# Patient Record
Sex: FEMALE | Race: BLACK OR AFRICAN AMERICAN | NOT HISPANIC OR LATINO | URBAN - METROPOLITAN AREA
[De-identification: names, ages, dates, MRNs, and addresses within clinical notes are randomized per-mention and may not be internally consistent; named-entity substitution may affect disease eponyms.]

---

## 2021-12-20 ENCOUNTER — EMERGENCY (EMERGENCY)
Facility: HOSPITAL | Age: 21
LOS: 1 days | Discharge: HOME | End: 2021-12-20
Attending: EMERGENCY MEDICINE
Payer: MEDICAID

## 2021-12-20 VITALS
DIASTOLIC BLOOD PRESSURE: 97 MMHG | TEMPERATURE: 98 F | RESPIRATION RATE: 18 BRPM | SYSTOLIC BLOOD PRESSURE: 133 MMHG | OXYGEN SATURATION: 100 % | HEART RATE: 77 BPM

## 2021-12-20 VITALS
TEMPERATURE: 98 F | RESPIRATION RATE: 20 BRPM | SYSTOLIC BLOOD PRESSURE: 112 MMHG | OXYGEN SATURATION: 99 % | HEIGHT: 65 IN | WEIGHT: 229.94 LBS | HEART RATE: 76 BPM | DIASTOLIC BLOOD PRESSURE: 75 MMHG

## 2021-12-20 PROCEDURE — 99284 EMERGENCY DEPT VISIT MOD MDM: CPT

## 2021-12-20 NOTE — ED ADULT NURSE NOTE - OBJECTIVE STATEMENT
21y female, PMH asthma, AAOx3, pt complaining of subjective fevers, guinea pig bite to foot and left hip pain, pt denies HA, chest pain, shortness of breath, abd pain, n/v/d, urinary symptoms, bed in lowest position, comfort and safety provided.

## 2021-12-21 LAB
HCG SERPL-ACNC: <2 MIU/ML — SIGNIFICANT CHANGE UP
RAPID RVP RESULT: DETECTED
RV+EV RNA SPEC QL NAA+PROBE: DETECTED
SARS-COV-2 RNA SPEC QL NAA+PROBE: SIGNIFICANT CHANGE UP

## 2021-12-21 PROCEDURE — 84702 CHORIONIC GONADOTROPIN TEST: CPT

## 2021-12-21 PROCEDURE — 96375 TX/PRO/DX INJ NEW DRUG ADDON: CPT

## 2021-12-21 PROCEDURE — 96374 THER/PROPH/DIAG INJ IV PUSH: CPT

## 2021-12-21 PROCEDURE — 99284 EMERGENCY DEPT VISIT MOD MDM: CPT | Mod: 25

## 2021-12-21 PROCEDURE — 0225U NFCT DS DNA&RNA 21 SARSCOV2: CPT

## 2021-12-21 RX ORDER — SODIUM CHLORIDE 9 MG/ML
1000 INJECTION, SOLUTION INTRAVENOUS ONCE
Refills: 0 | Status: COMPLETED | OUTPATIENT
Start: 2021-12-21 | End: 2021-12-21

## 2021-12-21 RX ORDER — KETOROLAC TROMETHAMINE 30 MG/ML
15 SYRINGE (ML) INJECTION ONCE
Refills: 0 | Status: DISCONTINUED | OUTPATIENT
Start: 2021-12-21 | End: 2021-12-21

## 2021-12-21 RX ORDER — METOCLOPRAMIDE HCL 10 MG
10 TABLET ORAL ONCE
Refills: 0 | Status: COMPLETED | OUTPATIENT
Start: 2021-12-21 | End: 2021-12-21

## 2021-12-21 RX ORDER — ACETAMINOPHEN 500 MG
650 TABLET ORAL ONCE
Refills: 0 | Status: COMPLETED | OUTPATIENT
Start: 2021-12-21 | End: 2021-12-21

## 2021-12-21 RX ADMIN — Medication 650 MILLIGRAM(S): at 00:40

## 2021-12-21 RX ADMIN — Medication 15 MILLIGRAM(S): at 00:40

## 2021-12-21 RX ADMIN — Medication 10 MILLIGRAM(S): at 00:40

## 2021-12-21 RX ADMIN — SODIUM CHLORIDE 1000 MILLILITER(S): 9 INJECTION, SOLUTION INTRAVENOUS at 00:41

## 2021-12-21 NOTE — ED PROVIDER NOTE - NSFOLLOWUPINSTRUCTIONS_ED_ALL_ED_FT
You were seen in the ED for fever and a headache and cough.  Your covid test is still pending. Call the hospital tomorrow for results.    Your symptoms may be due to a viral illness, but you should have further workup with a  primary care doctor in 2-3 days.    For pain and fever, please take 650mg Tylenol every 6 hours as needed or 600mg ibuprofen every 8 hours as needed. Always take ibuprofen with food. You make take both Tylenol and ibuprofen as described above if one medication is not enough for your pain.    Please follow up with your PCP in 2-3 days. Return to the ED if you experience any worsening or new symptoms or any symptoms that concern you, including fevers, chills, shortness of breath, chest pain, numbness, weakness.

## 2021-12-21 NOTE — ED PROVIDER NOTE - OBJECTIVE STATEMENT
Pt is a 22 yo prev healthy F who presents with fever, cough, headache. Symptoms started 3-4 days ago. Subjective fevers, not measured. Covid vaxxed x2, no sick contacts, works as . Headache comes and goes, can be mild or severe, L or R sided, no vision changes, no N/V, no diarrhea. Cough causes chest tightness. Endorses L leg pain, has had sports injury before in that area, denies leg swelling. Had guinea pig bite in L foot 1-2 weeks ago but swelling resolved and foot/calf are pain free. no trauma, no h/o migraine

## 2021-12-21 NOTE — ED PROVIDER NOTE - CLINICAL SUMMARY MEDICAL DECISION MAKING FREE TEXT BOX
Eileen - Pt is a 20 yo prev healthy F who presents with fever, cough, headache. likely viral illness, covid vs other viral etiology. will check hcg, rvp. give pain meds and fluids for headache, no clinical indication for CT Eileen Pierson Pt is a 20 yo prev healthy F who presents with fever, cough, headache. likely viral illness, covid vs other viral etiology. will check hcg, rvp. give pain meds and fluids for headache, no clinical indication for CT    Marin Rosenberg MD, FACEP patient with cough headache and fever, well appearing, eating and drinking without difficulty. Will screen for COVID-19 with rvp. patient educated to take Tylenol 1g every six hours and supplement with ibuprofen 600mg, with food, every six hours which can be taken three hours apart from the Tylenol to have a layered effect.    The patient was serially evaluated throughout emergency department course. There was no acute deterioration up to this time in the department. Patient has demonstrated clinical improvement and is stable, feels better at this time according to emergency department team. Agree with goals/plan of emergency department care as described in this physician's electronic medical record, including diagnostics, therapeutics and consultation as clinically warranted. Will discharge home with close outpatient follow up with primary care physician/provider and specialist if necessary. The patient and/or family was educated on concerning signs and features to return to the emergency department, in layman terms, including but not limited to: nausea, vomiting, fever, chills, persistent/worsening symptoms or any concerns at all. No immediate life threatening issues present on history, clinical exam, or any diagnostic evaluation. The patient is a safe disposition home, has capacity and insight into their condition, is ambulatory in the Emergency Department with no further questions and will follow up with their doctor(s) this week. Diagnosis, prognosis, natural history and treatment was discussed with patient and/or family. The patient and/or family were given the opportunity to ask questions and have them answered in full. The patient and/or family are with capacity and insight into the situation, treatment, risks, benefits, alternative therapies, and understand that they can ask any further questions if needed. Patient and/or family/guardian understands anticipatory guidance and was given strict return and follow up precautions. The patient and/or family/guardian has been informed, in layman terms, of all concerning signs and symptoms to return to Emergency Department, the necessity to follow up with the PMD/Clinic/follow up provided within 2-3 days was explained, and the patient and/or family/guardian reports understanding of above with capacity and insight. The patient and/or family/guardian were informed of any results of their tests and are were encouraged to follow up on the findings with their doctor as well as the need to inform their doctor of any results. The patient and/or family/guardian are aware of the need to follow up with repeat testing as applicable and report understanding of the above with capacity and insight. The patient and/or family/guardian was made aware of any pending test results at the time of discharge and of the need to call back for the final results a well as the need to inform their doctor of the results.

## 2021-12-21 NOTE — ED PROVIDER NOTE - PHYSICAL EXAMINATION
Shanae Arellano MD  GENERAL: Patient awake alert NAD.  HEENT: NC/AT, Moist mucous membranes, PERRL, EOMI.  LUNGS: CTAB, no wheezes or crackles.   CARDIAC: RRR, no m/r/g.    ABDOMEN: Soft, NT, ND, No rebound, guarding. No CVA tenderness.   EXT: No edema. No calf tenderness. CV 2+DP/PT bilaterally.   MSK: no pain with movement, no deformities.  NEURO: A&Ox3. Moving all extremities.  SKIN: Warm and dry. No rash.  PSYCH: Normal affect.

## 2021-12-21 NOTE — ED PROVIDER NOTE - NSFOLLOWUPCLINICS_GEN_ALL_ED_FT
[facility] General Internal Medicine  General Internal Medicine  [address] [place] [address]  Phone: [phone]  Fax:

## 2021-12-21 NOTE — ED PROVIDER NOTE - PROGRESS NOTE DETAILS
pt feels better, ha gone. discussed f/u and return precautions, rvp pending, pt aware Marin Rosenberg MD, FACEP patient without tachypnea, non-tachycardic, well appearing, speaking in full sentences, The patient tolerated an oral "po" challenge.

## 2021-12-21 NOTE — ED PROVIDER NOTE - PATIENT PORTAL LINK FT
You can access the FollowMyHealth Patient Portal offered by [facility] by registering at the following website: [email or web address]. By joining [employer]’s FollowMyHealth portal, you will also be able to view your health information using other applications (apps) compatible with our system.

## 2022-04-21 ENCOUNTER — EMERGENCY (EMERGENCY)
Facility: HOSPITAL | Age: 22
LOS: 1 days | Discharge: HOME | End: 2022-04-21
Attending: STUDENT IN AN ORGANIZED HEALTH CARE EDUCATION/TRAINING PROGRAM
Payer: MEDICAID

## 2022-04-21 VITALS
TEMPERATURE: 98 F | OXYGEN SATURATION: 100 % | HEIGHT: 65 IN | SYSTOLIC BLOOD PRESSURE: 116 MMHG | DIASTOLIC BLOOD PRESSURE: 79 MMHG | RESPIRATION RATE: 18 BRPM | WEIGHT: 229.94 LBS | HEART RATE: 91 BPM

## 2022-04-21 PROCEDURE — 99284 EMERGENCY DEPT VISIT MOD MDM: CPT

## 2022-04-22 PROBLEM — Z78.9 OTHER SPECIFIED HEALTH STATUS: Chronic | Status: ACTIVE | Noted: 2021-12-21

## 2022-04-22 LAB
HCOV PNL SPEC NAA+PROBE: DETECTED
RAPID RVP RESULT: DETECTED
SARS-COV-2 RNA SPEC QL NAA+PROBE: SIGNIFICANT CHANGE UP

## 2022-04-22 PROCEDURE — 0225U NFCT DS DNA&RNA 21 SARSCOV2: CPT

## 2022-04-22 PROCEDURE — 99283 EMERGENCY DEPT VISIT LOW MDM: CPT

## 2022-04-22 NOTE — ED PROVIDER NOTE - NEUROLOGICAL, MLM
Alert and oriented, no focal deficits, no motor or sensory deficits. Alert and oriented, no focal deficits, no motor or sensory deficits. strength and sensation intact throughout, smooth gait. normal speech.

## 2022-04-22 NOTE — ED POST DISCHARGE NOTE - ADDITIONAL DOCUMENTATION
4/22/22: Pt returned call. Informed her of result as above. Reiterated supportive measures and return precautions. Pt acknowledged understanding, all questions answered, appreciative of call. - Santino Chaudhari PA-C.

## 2022-04-22 NOTE — ED PROVIDER NOTE - PATIENT PORTAL LINK FT
You can access the FollowMyHealth Patient Portal offered by [facility] by registering at the following website: [email or web address]. By joining "[employer]"’s FollowMyHealth portal, you will also be able to view your health information using other applications (apps) compatible with our system.

## 2022-04-22 NOTE — ED PROVIDER NOTE - CLINICAL SUMMARY MEDICAL DECISION MAKING FREE TEXT BOX
22yo F presenting with intermittent throbbing headache and subjective fevers for the past week with sinus congestion. well appearing, no headache at this time, vital signs wnl. headaches possibly due to migraines and non-compliance with medication, seasonal allergies, viral infection, need for prescription eye glasses. no meningeal signs or concern for meningitis at this time. had extensive conversation concerning supportive care, usage of migraine medication and otc allergy medication as well as wearing eye glasses. will obtain rvp and dc home with pmd and neurology follow up.

## 2022-04-22 NOTE — ED PROVIDER NOTE - NSFOLLOWUPINSTRUCTIONS_ED_ALL_ED_FT
No signs of emergency medical condition on today's workup.  Presumptive diagnosis made, but further evaluation may be required by your primary care doctor or specialist for a definitive diagnosis.  Therefore, follow up as directed and if symptoms change/worsen or any emergency conditions, please return to the ER.   you were seen in the ED today for headaches.   you had a rapid viral panel performed and you will receive an email with the results.   please take prescribed medication for headaches given by your neurologist.   recommend wearing your prescription glasses more often to prevent straining of your eyes.   would recommend starting a allergy medication and nose spray such as flonase for seasonal allergies.   follow up with your neurologist and primary care doctor.   return to the emergency department for any new or worsening symptoms.

## 2022-04-22 NOTE — ED PROVIDER NOTE - NSICDXPASTMEDICALHX_GEN_ALL_CORE_FT
Normal rate, regular rhythm.  Heart sounds S1, S2.  No murmurs, rubs or gallops.
PAST MEDICAL HISTORY:  No pertinent past medical history

## 2022-04-22 NOTE — ED ADULT NURSE REASSESSMENT NOTE - NS ED NURSE REASSESS COMMENT FT1
Patient d/c. Reviewed d/c paperwork with patients, all questions answered at this time. Patient verbalizes understanding. Patient instructed to return to the ER for any worsening s/s including chest pain, SOB, fever, n/v/d. Patient alert and stable at time of d/c. Patient will follow up with her neurologist.

## 2022-04-22 NOTE — ED PROVIDER NOTE - ATTENDING CONTRIBUTION TO CARE
I, Bailey Duarte, performed a history and physical exam of the patient and discussed their management with the resident and /or advanced care provider. I reviewed the resident and /or ACP's note and agree with the documented findings and plan of care except where otherwise noted in my note. I was present and available for all procedures.    22 yo migraines, asthma p/w fever (subjective), lightheadness with standing, headache, nonproductive cough x 1 week, symptoms improving.   Headache is intermittent, throbbing, feels similar to prior migraines  Tolerating PO   No numbness/tingling, weakness, vision changes.   Takes Nurtec for migraines but not consistently     PHYSICAL EXAM:   General: well-appearing, appears stated age, not in extremis   HEENT: NC/AT, no facial sinus tenderness, airway patent  Cardiovascular: regular rate and rhythm, + S1/S2, no murmurs, rubs, gallops appreciated  Respiratory: clear to auscultation bilaterally, good aeration bilaterally, nonlabored respirations  Abdominal: soft, nontender, nondistended, no rebound, guarding or rigidity  Back: no costovertebral tenderness,   Extremities: no LE edema or calf tenderness b/l.   Neuro: Alert and oriented x3. Moving all extremities. No focal deficits  Psychiatric: appropriate mood and affect.   -Bailey Duarte MD Attending Physician     Likely viral syndrome, low suspicion bacterial PNA. xray discussed with patient - would like to defer for now. Will get rvp, return pracuations, outpatient follow up I, Bailey Duarte, performed a history and physical exam of the patient and discussed their management with the resident and /or advanced care provider. I reviewed the resident and /or ACP's note and agree with the documented findings and plan of care except where otherwise noted in my note. I was present and available for all procedures.    22 yo migraines, asthma p/w fever (subjective), lightheadness with standing, headache, nonproductive cough x 1 week, symptoms improving.   Headache is intermittent, throbbing, feels similar to prior migraines  Tolerating PO   No numbness/tingling, weakness, vision changes.   Takes Nurtec for migraines but not consistently     PHYSICAL EXAM:   General: well-appearing, appears stated age, not in extremis   HEENT: NC/AT, no facial sinus tenderness, airway patent  Cardiovascular: regular rate and rhythm, + S1/S2, no murmurs, rubs, gallops appreciated  Respiratory: clear to auscultation bilaterally, good aeration bilaterally, nonlabored respirations  Abdominal: soft, nontender, nondistended, no rebound, guarding or rigidity  Back: no costovertebral tenderness,   Extremities: no LE edema or calf tenderness b/l.   Neuro: Alert and oriented x3. Moving all extremities. No focal deficits  Psychiatric: appropriate mood and affect.   -Bailey Duarte MD Attending Physician     Likely viral syndrome, low suspicion bacterial PNA. low concern meningitis xray discussed with patient - would like to defer for now. Will get rvp, return pracuations, outpatient follow up I, Bailey Duarte, performed a history and physical exam of the patient and discussed their management with the resident and /or advanced care provider. I reviewed the resident and /or ACP's note and agree with the documented findings and plan of care except where otherwise noted in my note. I was present and available for all procedures.    20 yo migraines, asthma p/w fever (subjective), lightheadedness with standing, headache, nonproductive cough x 1 week, symptoms improving.   Headache is intermittent, throbbing, feels similar to prior migraines  Tolerating PO   No numbness/tingling, weakness, vision changes.   Takes Nurtec for migraines but not consistently , had recent reportedly normal mri with neurology    PHYSICAL EXAM:   General: well-appearing, appears stated age, not in extremis   HEENT: NC/AT, no facial sinus tenderness, airway patent  Cardiovascular: regular rate and rhythm, + S1/S2, no murmurs, rubs, gallops appreciated  Respiratory: clear to auscultation bilaterally, good aeration bilaterally, nonlabored respirations  Abdominal: soft, nontender, nondistended, no rebound, guarding or rigidity  Back: no costovertebral tenderness,   Extremities: no LE edema or calf tenderness b/l.   Neuro: Alert and oriented x3. Moving all extremities. No focal deficits  Psychiatric: appropriate mood and affect.   -Bailey Duarte MD Attending Physician     Likely viral syndrome, low suspicion bacterial PNA, cough nonproductive, no focal lung findings. , xray discussed with patient - would like to defer for now. low concern for meningitis. Will get rvp, return pracuations, outpatient follow up

## 2022-04-22 NOTE — ED ADULT NURSE NOTE - OBJECTIVE STATEMENT
20 y/o female coming to the ER with c/o fevers and headache. PMH migraines. Patient states she has had subjective fevers as well as headaches the past week. Patient states that she has not been taking her migraine medications and has not followed up with her neurologist. Patient endorses photosensitivity as well as a 7/10 headache. PERRL. Patient moving all extremities. Patient denies chest pain, blurry vision, numbness/tingling, chills, n/v/d, urinary symptoms, abdominal pain. Safety measures maintained. Bed in the lowest position. Call bell within reach. No acute distress noted or further complaints at this time.

## 2022-04-22 NOTE — ED PROVIDER NOTE - OBJECTIVE STATEMENT
22yo F with Hx of migraines presenting with complaints of headache. recently diagnosed with migraines and given prescription by her neurologist which she hasn't been taking. has been having intermittent throbbing headaches for the past week with subjective fevers. with nasal congestion and sinus pressure. occasional lightheadedness with HA upon standing. no LOC, visual changes, nausea, vomiting, cough, chest pain, sob. has prescription glasses but doesn't wear them daily. has been taking ibuprofen. currently no headache or complaints.

## 2022-10-31 ENCOUNTER — EMERGENCY (EMERGENCY)
Facility: HOSPITAL | Age: 22
LOS: 1 days | Discharge: HOME | End: 2022-10-31
Attending: EMERGENCY MEDICINE
Payer: MEDICAID

## 2022-10-31 VITALS
OXYGEN SATURATION: 99 % | HEART RATE: 72 BPM | RESPIRATION RATE: 18 BRPM | SYSTOLIC BLOOD PRESSURE: 124 MMHG | DIASTOLIC BLOOD PRESSURE: 84 MMHG | TEMPERATURE: 98 F

## 2022-10-31 VITALS
WEIGHT: 225.09 LBS | RESPIRATION RATE: 14 BRPM | OXYGEN SATURATION: 99 % | HEART RATE: 75 BPM | HEIGHT: 65 IN | SYSTOLIC BLOOD PRESSURE: 125 MMHG | TEMPERATURE: 99 F | DIASTOLIC BLOOD PRESSURE: 86 MMHG

## 2022-10-31 LAB — HCG UR QL: NEGATIVE — SIGNIFICANT CHANGE UP

## 2022-10-31 PROCEDURE — 71046 X-RAY EXAM CHEST 2 VIEWS: CPT

## 2022-10-31 PROCEDURE — 81025 URINE PREGNANCY TEST: CPT

## 2022-10-31 PROCEDURE — 93005 ELECTROCARDIOGRAM TRACING: CPT

## 2022-10-31 PROCEDURE — 99284 EMERGENCY DEPT VISIT MOD MDM: CPT

## 2022-10-31 PROCEDURE — 82962 GLUCOSE BLOOD TEST: CPT

## 2022-10-31 PROCEDURE — 71046 X-RAY EXAM CHEST 2 VIEWS: CPT | Mod: 26

## 2022-10-31 PROCEDURE — 99285 EMERGENCY DEPT VISIT HI MDM: CPT | Mod: 25

## 2022-10-31 PROCEDURE — 93010 ELECTROCARDIOGRAM REPORT: CPT

## 2022-10-31 RX ORDER — FLUCONAZOLE 150 MG/1
100 TABLET ORAL ONCE
Refills: 0 | Status: DISCONTINUED | OUTPATIENT
Start: 2022-10-31 | End: 2022-10-31

## 2022-10-31 RX ORDER — FLUCONAZOLE 150 MG/1
150 TABLET ORAL ONCE
Refills: 0 | Status: COMPLETED | OUTPATIENT
Start: 2022-10-31 | End: 2022-10-31

## 2022-10-31 RX ADMIN — FLUCONAZOLE 150 MILLIGRAM(S): 150 TABLET ORAL at 19:11

## 2022-10-31 NOTE — ED PROVIDER NOTE - NSFOLLOWUPINSTRUCTIONS_ED_ALL_ED_FT
See your Primary Doctor and OBGYN this week for follow up -- call to discuss.    Stay well hydrated, eat regular healthy meals, get plenty of rest, minimize stress, practice tactile breathing.    See PANIC ATTACK information and return instructions given to you.    Seek immediate medical care for new/worsening symptoms/concerns. See your Primary Doctor and OBGYN this week for follow up -- call to discuss.    Stay well hydrated, eat regular healthy meals, get plenty of rest, minimize stress, practice tactile breathing.    See PANIC ATTACK and VULVOVAGINITIS information and return instructions given to you.    Seek immediate medical care for new/worsening symptoms/concerns.

## 2022-10-31 NOTE — ED PROVIDER NOTE - PATIENT PORTAL LINK FT
You can access the FollowMyHealth Patient Portal offered by [facility] by registering at the following website: [email or web address]. By joining [employer]’s FollowMyHealth portal, you will also be able to view your health information using other applications (apps) compatible with our system.

## 2022-10-31 NOTE — ED ADULT NURSE NOTE - OBJECTIVE STATEMENT
Female 22 years old alert and orientedx4 came in for chest pain. Pt reports intermittent chest pain non radiating for 2 weeks associated with mild dizziness and sob when going up the stairs. Reports also "yeast infection" on and off for a year". I want it to be check". Reports mild vaginal itching. Denies fever, cough, N/V or urinary symptoms. Will monitor.

## 2022-10-31 NOTE — ED PROVIDER NOTE - CARE PLAN
Principal Discharge DX:	Anxiety attack   1 Principal Discharge DX:	Anxiety attack  Secondary Diagnosis:	Vulvovaginitis

## 2022-10-31 NOTE — ED PROVIDER NOTE - NS ED ROS FT
GENERAL: no fever, no chills, no weight loss  EYES: no change in vision, no irritation, no discharge, no redness, no pain  HEENT: no trouble swallowing or speaking, no throat pain, no ear pain  CARDIAC: +chest pain, no palpitations   PULMONARY: no cough, no shortness of breath, no wheezing  GI: no abdominal pain, no nausea, no vomiting, no diarrhea, no constipation, no melena, no hematochezia, no hematemesis  : +vaginal itching, vaginal bumps, no changes in urination, no dysuria, no frequency, no hematuria, no discharge  SKIN: no rashes  NEURO: no headache, no numbness, no weakness  MSK: no joint pain, no muscle pain, no back pain, no calf pain

## 2022-10-31 NOTE — ED ADULT NURSE NOTE - NS_SISCREENINGSR_GEN_ALL_ED
Quality 226: Preventive Care And Screening: Tobacco Use: Screening And Cessation Intervention: Patient screened for tobacco use and is an ex/non-smoker Quality 130: Documentation Of Current Medications In The Medical Record: Current Medications Documented Detail Level: Detailed Quality 265: Biopsy Follow-Up: Biopsy results reviewed, communicated, tracked, and documented Quality 431: Preventive Care And Screening: Unhealthy Alcohol Use - Screening: Patient screened for unhealthy alcohol use using a single question and scores less than 2 times per year Negative

## 2022-10-31 NOTE — ED PROVIDER NOTE - OBJECTIVE STATEMENT
23 yo F no pmh presents with chest pain for several days and "bumps" on vagina after candida infections for several days. Patient has non exertional, non pleuritic chest pain, worse after eating. Comes on when she's anxious. No nausea, vomiting, sob, leg swelling. Patient has been seen by her obgyn for multiple candida episodes with itchiness. Recently noticed bumps on the inner labia which were seen by the ob gyn but negative for STI/STDs. No fevers, chills, vaginal bleeding, discharge. Patient is sexually active with men, uses condoms.

## 2022-10-31 NOTE — ED PROVIDER NOTE - PHYSICAL EXAMINATION
GENERAL: no acute distress, non-toxic appearing  HEAD: normocephalic, atraumatic  HEENT: normal conjunctiva, oral mucosa moist, neck supple  CARDIAC: regular rate and rhythm, normal S1 and S2,  no appreciable murmurs  PULM: clear to ascultation bilaterally, no crackles, rales, rhonchi, or wheezing  GI: abdomen nondistended, soft, nontender, no guarding or rebound tenderness  : +.5 cm diameter skin colored papular on inner labia, no discolored discharge, cervical os closed with no cervical motion tenderness   NEURO: alert and oriented x 3, normal speech, moving all extremities   MSK: no visible deformities, no peripheral edema, calf tenderness/redness/swelling  SKIN: no visible rashes, dry, well-perfused  PSYCH: appropriate mood and affect

## 2022-10-31 NOTE — ED PROVIDER NOTE - ATTENDING CONTRIBUTION TO CARE
------------ATTENDING NOTE------------  pt c/o episodes of feeling anxious, mild dull ache in L chest, feels heart rate gradually increase and breathing faster, lasts up to 10 minutes, currently no symptoms, no SI/HI, nml cardiac exam w/ equal distal pulses, clear chest w/o distress, soft benign abd, no edema/calf tenderness,   - Morgan Torres MD   ---------------------------------------------- ------------ATTENDING NOTE------------  pt c/o episodes of feeling anxious, mild dull ache in L chest, feels heart rate gradually increase and breathing faster, lasts up to 10 minutes, currently no symptoms, no SI/HI, nml cardiac exam w/ equal distal pulses, clear chest w/o distress, soft benign abd, no edema/calf tenderness, pt has itchy white vaginal discharge similar to past yeast infections, observed w/o new/worsening symptoms, nml VS, in depth dw pt about ddx, tx, yates, continued close outpt fu.  - Morgan Torres MD   ----------------------------------------------

## 2023-10-28 NOTE — ED PROVIDER NOTE - PRINCIPAL DIAGNOSIS
Labor Progress Note        SUBJECTIVE      Reports comfortable with epidural     OBJECTIVE      Vitals:    10/27/23 2130   BP: 91/60   Pulse: 79   Resp: 16   Temp: 97.7 °F (36.5 °C)        SVE:    Dilation:  6 (10/27/23 2157)  Effacement:  80 (10/27/23 2157)  Station:  -3 (10/27/23 2157)    Fetal Surveillance:  Fetal Heart Rate  120/Moderate variability/+ accels/early decels        ASSESSMENT      40w6d IUP  Active phase labor.  FHR Interpretation: Category 1         PLAN      SROM - continue augmentation with pitocin, IUPC placed to measure mvus, fetus in LOT position  FWB - Cat I  GBS - neg  Pain - epidural in place     Guerita Cross MD     Migraine headache Viral syndrome

## 2023-12-15 ENCOUNTER — EMERGENCY (EMERGENCY)
Facility: HOSPITAL | Age: 23
LOS: 1 days | Discharge: HOME | End: 2023-12-15
Attending: EMERGENCY MEDICINE
Payer: MEDICAID

## 2023-12-15 VITALS
DIASTOLIC BLOOD PRESSURE: 84 MMHG | SYSTOLIC BLOOD PRESSURE: 123 MMHG | RESPIRATION RATE: 18 BRPM | OXYGEN SATURATION: 99 % | HEIGHT: 64 IN | HEART RATE: 85 BPM | TEMPERATURE: 99 F | WEIGHT: 220.02 LBS

## 2023-12-15 VITALS
TEMPERATURE: 98 F | DIASTOLIC BLOOD PRESSURE: 67 MMHG | SYSTOLIC BLOOD PRESSURE: 109 MMHG | OXYGEN SATURATION: 98 % | RESPIRATION RATE: 18 BRPM | HEART RATE: 80 BPM

## 2023-12-15 LAB
ALBUMIN SERPL ELPH-MCNC: 4.5 G/DL — SIGNIFICANT CHANGE UP (ref 3.3–5)
ALBUMIN SERPL ELPH-MCNC: 4.5 G/DL — SIGNIFICANT CHANGE UP (ref 3.3–5)
ALP SERPL-CCNC: 60 U/L — SIGNIFICANT CHANGE UP (ref 40–120)
ALP SERPL-CCNC: 60 U/L — SIGNIFICANT CHANGE UP (ref 40–120)
ALT FLD-CCNC: 12 U/L — SIGNIFICANT CHANGE UP (ref 10–45)
ALT FLD-CCNC: 12 U/L — SIGNIFICANT CHANGE UP (ref 10–45)
ANION GAP SERPL CALC-SCNC: 9 MMOL/L — SIGNIFICANT CHANGE UP (ref 5–17)
ANION GAP SERPL CALC-SCNC: 9 MMOL/L — SIGNIFICANT CHANGE UP (ref 5–17)
AST SERPL-CCNC: 17 U/L — SIGNIFICANT CHANGE UP (ref 10–40)
AST SERPL-CCNC: 17 U/L — SIGNIFICANT CHANGE UP (ref 10–40)
BASOPHILS # BLD AUTO: 0.07 K/UL — SIGNIFICANT CHANGE UP (ref 0–0.2)
BASOPHILS # BLD AUTO: 0.07 K/UL — SIGNIFICANT CHANGE UP (ref 0–0.2)
BASOPHILS NFR BLD AUTO: 1.6 % — SIGNIFICANT CHANGE UP (ref 0–2)
BASOPHILS NFR BLD AUTO: 1.6 % — SIGNIFICANT CHANGE UP (ref 0–2)
BILIRUB SERPL-MCNC: 0.4 MG/DL — SIGNIFICANT CHANGE UP (ref 0.2–1.2)
BILIRUB SERPL-MCNC: 0.4 MG/DL — SIGNIFICANT CHANGE UP (ref 0.2–1.2)
BUN SERPL-MCNC: 14 MG/DL — SIGNIFICANT CHANGE UP (ref 7–23)
BUN SERPL-MCNC: 14 MG/DL — SIGNIFICANT CHANGE UP (ref 7–23)
CALCIUM SERPL-MCNC: 9.2 MG/DL — SIGNIFICANT CHANGE UP (ref 8.4–10.5)
CALCIUM SERPL-MCNC: 9.2 MG/DL — SIGNIFICANT CHANGE UP (ref 8.4–10.5)
CHLORIDE SERPL-SCNC: 104 MMOL/L — SIGNIFICANT CHANGE UP (ref 96–108)
CHLORIDE SERPL-SCNC: 104 MMOL/L — SIGNIFICANT CHANGE UP (ref 96–108)
CO2 SERPL-SCNC: 24 MMOL/L — SIGNIFICANT CHANGE UP (ref 22–31)
CO2 SERPL-SCNC: 24 MMOL/L — SIGNIFICANT CHANGE UP (ref 22–31)
CREAT SERPL-MCNC: 0.74 MG/DL — SIGNIFICANT CHANGE UP (ref 0.5–1.3)
CREAT SERPL-MCNC: 0.74 MG/DL — SIGNIFICANT CHANGE UP (ref 0.5–1.3)
EGFR: 117 ML/MIN/1.73M2 — SIGNIFICANT CHANGE UP
EGFR: 117 ML/MIN/1.73M2 — SIGNIFICANT CHANGE UP
EOSINOPHIL # BLD AUTO: 0.21 K/UL — SIGNIFICANT CHANGE UP (ref 0–0.5)
EOSINOPHIL # BLD AUTO: 0.21 K/UL — SIGNIFICANT CHANGE UP (ref 0–0.5)
EOSINOPHIL NFR BLD AUTO: 4.9 % — SIGNIFICANT CHANGE UP (ref 0–6)
EOSINOPHIL NFR BLD AUTO: 4.9 % — SIGNIFICANT CHANGE UP (ref 0–6)
FLUAV AG NPH QL: SIGNIFICANT CHANGE UP
FLUAV AG NPH QL: SIGNIFICANT CHANGE UP
FLUBV AG NPH QL: SIGNIFICANT CHANGE UP
FLUBV AG NPH QL: SIGNIFICANT CHANGE UP
GAS PNL BLDV: SIGNIFICANT CHANGE UP
GAS PNL BLDV: SIGNIFICANT CHANGE UP
GLUCOSE SERPL-MCNC: 90 MG/DL — SIGNIFICANT CHANGE UP (ref 70–99)
GLUCOSE SERPL-MCNC: 90 MG/DL — SIGNIFICANT CHANGE UP (ref 70–99)
HAV IGM SER-ACNC: SIGNIFICANT CHANGE UP
HAV IGM SER-ACNC: SIGNIFICANT CHANGE UP
HBV CORE IGM SER-ACNC: SIGNIFICANT CHANGE UP
HBV CORE IGM SER-ACNC: SIGNIFICANT CHANGE UP
HBV SURFACE AG SER-ACNC: SIGNIFICANT CHANGE UP
HBV SURFACE AG SER-ACNC: SIGNIFICANT CHANGE UP
HCG SERPL-ACNC: <2 MIU/ML — SIGNIFICANT CHANGE UP
HCG SERPL-ACNC: <2 MIU/ML — SIGNIFICANT CHANGE UP
HCT VFR BLD CALC: 39.3 % — SIGNIFICANT CHANGE UP (ref 34.5–45)
HCT VFR BLD CALC: 39.3 % — SIGNIFICANT CHANGE UP (ref 34.5–45)
HCV AB S/CO SERPL IA: 0.1 S/CO — SIGNIFICANT CHANGE UP (ref 0–0.99)
HCV AB S/CO SERPL IA: 0.1 S/CO — SIGNIFICANT CHANGE UP (ref 0–0.99)
HCV AB SERPL-IMP: SIGNIFICANT CHANGE UP
HCV AB SERPL-IMP: SIGNIFICANT CHANGE UP
HGB BLD-MCNC: 12.1 G/DL — SIGNIFICANT CHANGE UP (ref 11.5–15.5)
HGB BLD-MCNC: 12.1 G/DL — SIGNIFICANT CHANGE UP (ref 11.5–15.5)
HIV 1 & 2 AB SERPL IA.RAPID: SIGNIFICANT CHANGE UP
HIV 1 & 2 AB SERPL IA.RAPID: SIGNIFICANT CHANGE UP
HIV 1+2 AB+HIV1 P24 AG SERPL QL IA: SIGNIFICANT CHANGE UP
HIV 1+2 AB+HIV1 P24 AG SERPL QL IA: SIGNIFICANT CHANGE UP
LYMPHOCYTES # BLD AUTO: 1.7 K/UL — SIGNIFICANT CHANGE UP (ref 1–3.3)
LYMPHOCYTES # BLD AUTO: 1.7 K/UL — SIGNIFICANT CHANGE UP (ref 1–3.3)
LYMPHOCYTES # BLD AUTO: 39.8 % — SIGNIFICANT CHANGE UP (ref 13–44)
LYMPHOCYTES # BLD AUTO: 39.8 % — SIGNIFICANT CHANGE UP (ref 13–44)
MAGNESIUM SERPL-MCNC: 1.9 MG/DL — SIGNIFICANT CHANGE UP (ref 1.6–2.6)
MAGNESIUM SERPL-MCNC: 1.9 MG/DL — SIGNIFICANT CHANGE UP (ref 1.6–2.6)
MCHC RBC-ENTMCNC: 24.9 PG — LOW (ref 27–34)
MCHC RBC-ENTMCNC: 24.9 PG — LOW (ref 27–34)
MCHC RBC-ENTMCNC: 30.8 GM/DL — LOW (ref 32–36)
MCHC RBC-ENTMCNC: 30.8 GM/DL — LOW (ref 32–36)
MCV RBC AUTO: 81 FL — SIGNIFICANT CHANGE UP (ref 80–100)
MCV RBC AUTO: 81 FL — SIGNIFICANT CHANGE UP (ref 80–100)
MONOCYTES # BLD AUTO: 0.24 K/UL — SIGNIFICANT CHANGE UP (ref 0–0.9)
MONOCYTES # BLD AUTO: 0.24 K/UL — SIGNIFICANT CHANGE UP (ref 0–0.9)
MONOCYTES NFR BLD AUTO: 5.7 % — SIGNIFICANT CHANGE UP (ref 2–14)
MONOCYTES NFR BLD AUTO: 5.7 % — SIGNIFICANT CHANGE UP (ref 2–14)
NEUTROPHILS # BLD AUTO: 1.99 K/UL — SIGNIFICANT CHANGE UP (ref 1.8–7.4)
NEUTROPHILS # BLD AUTO: 1.99 K/UL — SIGNIFICANT CHANGE UP (ref 1.8–7.4)
NEUTROPHILS NFR BLD AUTO: 46.4 % — SIGNIFICANT CHANGE UP (ref 43–77)
NEUTROPHILS NFR BLD AUTO: 46.4 % — SIGNIFICANT CHANGE UP (ref 43–77)
PHOSPHATE SERPL-MCNC: 3.5 MG/DL — SIGNIFICANT CHANGE UP (ref 2.5–4.5)
PHOSPHATE SERPL-MCNC: 3.5 MG/DL — SIGNIFICANT CHANGE UP (ref 2.5–4.5)
PLATELET # BLD AUTO: 319 K/UL — SIGNIFICANT CHANGE UP (ref 150–400)
PLATELET # BLD AUTO: 319 K/UL — SIGNIFICANT CHANGE UP (ref 150–400)
POTASSIUM SERPL-MCNC: 4 MMOL/L — SIGNIFICANT CHANGE UP (ref 3.5–5.3)
POTASSIUM SERPL-MCNC: 4 MMOL/L — SIGNIFICANT CHANGE UP (ref 3.5–5.3)
POTASSIUM SERPL-SCNC: 4 MMOL/L — SIGNIFICANT CHANGE UP (ref 3.5–5.3)
POTASSIUM SERPL-SCNC: 4 MMOL/L — SIGNIFICANT CHANGE UP (ref 3.5–5.3)
PROT SERPL-MCNC: 8.3 G/DL — SIGNIFICANT CHANGE UP (ref 6–8.3)
PROT SERPL-MCNC: 8.3 G/DL — SIGNIFICANT CHANGE UP (ref 6–8.3)
RBC # BLD: 4.85 M/UL — SIGNIFICANT CHANGE UP (ref 3.8–5.2)
RBC # BLD: 4.85 M/UL — SIGNIFICANT CHANGE UP (ref 3.8–5.2)
RBC # FLD: 16.9 % — HIGH (ref 10.3–14.5)
RBC # FLD: 16.9 % — HIGH (ref 10.3–14.5)
RSV RNA NPH QL NAA+NON-PROBE: SIGNIFICANT CHANGE UP
RSV RNA NPH QL NAA+NON-PROBE: SIGNIFICANT CHANGE UP
SARS-COV-2 RNA SPEC QL NAA+PROBE: SIGNIFICANT CHANGE UP
SARS-COV-2 RNA SPEC QL NAA+PROBE: SIGNIFICANT CHANGE UP
SODIUM SERPL-SCNC: 137 MMOL/L — SIGNIFICANT CHANGE UP (ref 135–145)
SODIUM SERPL-SCNC: 137 MMOL/L — SIGNIFICANT CHANGE UP (ref 135–145)
T PALLIDUM AB TITR SER: NEGATIVE — SIGNIFICANT CHANGE UP
T PALLIDUM AB TITR SER: NEGATIVE — SIGNIFICANT CHANGE UP
WBC # BLD: 4.28 K/UL — SIGNIFICANT CHANGE UP (ref 3.8–10.5)
WBC # BLD: 4.28 K/UL — SIGNIFICANT CHANGE UP (ref 3.8–10.5)
WBC # FLD AUTO: 4.28 K/UL — SIGNIFICANT CHANGE UP (ref 3.8–10.5)
WBC # FLD AUTO: 4.28 K/UL — SIGNIFICANT CHANGE UP (ref 3.8–10.5)

## 2023-12-15 PROCEDURE — 83605 ASSAY OF LACTIC ACID: CPT

## 2023-12-15 PROCEDURE — 87389 HIV-1 AG W/HIV-1&-2 AB AG IA: CPT

## 2023-12-15 PROCEDURE — 85018 HEMOGLOBIN: CPT

## 2023-12-15 PROCEDURE — 84295 ASSAY OF SERUM SODIUM: CPT

## 2023-12-15 PROCEDURE — 86780 TREPONEMA PALLIDUM: CPT

## 2023-12-15 PROCEDURE — 82803 BLOOD GASES ANY COMBINATION: CPT

## 2023-12-15 PROCEDURE — 82435 ASSAY OF BLOOD CHLORIDE: CPT

## 2023-12-15 PROCEDURE — 99283 EMERGENCY DEPT VISIT LOW MDM: CPT

## 2023-12-15 PROCEDURE — 80053 COMPREHEN METABOLIC PANEL: CPT

## 2023-12-15 PROCEDURE — 84100 ASSAY OF PHOSPHORUS: CPT

## 2023-12-15 PROCEDURE — 85014 HEMATOCRIT: CPT

## 2023-12-15 PROCEDURE — 87637 SARSCOV2&INF A&B&RSV AMP PRB: CPT

## 2023-12-15 PROCEDURE — 85025 COMPLETE CBC W/AUTO DIFF WBC: CPT

## 2023-12-15 PROCEDURE — 84132 ASSAY OF SERUM POTASSIUM: CPT

## 2023-12-15 PROCEDURE — 82947 ASSAY GLUCOSE BLOOD QUANT: CPT

## 2023-12-15 PROCEDURE — 82330 ASSAY OF CALCIUM: CPT

## 2023-12-15 PROCEDURE — 86703 HIV-1/HIV-2 1 RESULT ANTBDY: CPT

## 2023-12-15 PROCEDURE — 84702 CHORIONIC GONADOTROPIN TEST: CPT

## 2023-12-15 PROCEDURE — 99284 EMERGENCY DEPT VISIT MOD MDM: CPT

## 2023-12-15 PROCEDURE — 83735 ASSAY OF MAGNESIUM: CPT

## 2023-12-15 PROCEDURE — 80074 ACUTE HEPATITIS PANEL: CPT

## 2023-12-15 RX ORDER — SODIUM CHLORIDE 9 MG/ML
1000 INJECTION, SOLUTION INTRAVENOUS ONCE
Refills: 0 | Status: COMPLETED | OUTPATIENT
Start: 2023-12-15 | End: 2023-12-15

## 2023-12-15 RX ADMIN — SODIUM CHLORIDE 1000 MILLILITER(S): 9 INJECTION, SOLUTION INTRAVENOUS at 16:43

## 2023-12-15 NOTE — ED PROVIDER NOTE - CARE PROVIDERS DIRECT ADDRESSES
[email or web address]

## 2023-12-15 NOTE — ED PROVIDER NOTE - PHYSICAL EXAMINATION
VITALS:   T(C): 37 (12-15-23 @ 12:11), Max: 37 (12-15-23 @ 12:11)  HR: 85 (12-15-23 @ 12:11) (85 - 85)  BP: 123/84 (12-15-23 @ 12:11) (123/84 - 123/84)  RR: 18 (12-15-23 @ 12:11) (18 - 18)  SpO2: 99% (12-15-23 @ 12:11) (99% - 99%)    GENERAL: NAD, lying in bed comfortably  HEAD:  Atraumatic, Normocephalic  EYES: EOMI, PERRLA, conjunctiva and sclera clear  ENT: Moist mucous membranes  NECK: Supple, No JVD  CHEST/LUNG: Clear to auscultation bilaterally; No rales, rhonchi, wheezing, or rubs. Unlabored respirations  HEART: Regular rate and rhythm; No murmurs, rubs, or gallops  ABDOMEN: BSx4; Soft, nondistended, +LLQ abdominal pain  EXTREMITIES:  2+ Peripheral Pulses, brisk capillary refill. No clubbing, cyanosis, or edema  NERVOUS SYSTEM:  A&Ox3, no focal deficits   SKIN: No rashes or lesions  psych: normal behavior, normal affect

## 2023-12-15 NOTE — ED PROVIDER NOTE - CLINICAL SUMMARY MEDICAL DECISION MAKING FREE TEXT BOX
24 yo F hx of vaginal candidiasis and HSV presents for n, v, diarrhea, and LLQ abdominal pain for the past 2 weeks. States she has been having diarrhea that is light-brown and non-bloody for the past 2 weeks. Last vomiting episode was last night. Denies recent travel. States her family gives her Albendazole (not prescribed) because everyone takes it for unclear reason. States her LMP was 2 weeks prior. Denies vaginal discharge or bleeding. D/D is broad including pregnancy, HIV, infectious diarrhea, autoimmune (such as IBD), etc. Will order CBC, CMP, lytes, Stool culture, PCR, O&P, HIV (pt consented), STD screening, HCG. 24 yo F hx of HSV on acyclovir presents for n, v, diarrhea for the past 2 weeks onset after eating korean bbq. Diarrhea multiple episodes per dy, today 3x, non bloody, initially very watery, now just soft. Nausea and vomiting has been improving, though last vomited last night. Denies recent travel. Took one dose albendazole her aunt had. States her LMP was 2 weeks prior. Denies vaginal discharge or bleeding. VS WNL. PE well appearing F, moist mm, no distress, +BS, soft, mild TTP R mid abdomen, L mid abdomen, and LLQ, w/o grr. Ddx includes but is not limited to: infectious diarrhea, autoimmune (such as IBD, electrolyte abn 2/2 nvd, dec po. Check basics, can send Stool culture, stool PCR, and O&P if she goes here, will send HIV (pt consented), STD screening as well. Likely DC. 24 yo F hx of HSV on acyclovir presents for n, v, diarrhea for the past 2 weeks onset after eating korean bbq. Diarrhea multiple episodes per dy, today 3x, non bloody, initially very watery, now just soft. Nausea and vomiting has been improving, though last vomited last night. Denies recent travel. Took one dose albendazole her aunt had. States her LMP was 2 weeks prior. Denies vaginal discharge or bleeding. VS WNL. PE well appearing F, moist mm, no distress, +BS, soft, mild TTP R mid abdomen, L mid abdomen, and LLQ, w/o grr. Ddx includes but is not limited to: infectious diarrhea, autoimmune (such as IBD, electrolyte abn 2/2 nvd, dec po. Check basics, can send Stool culture, stool PCR, and O&P if she goes here, will send HIV (pt consented), STD screening as well. HIV rapid negative, electrolytes wnl, no BM here so unable to send. Plan for dc with outpatient GI follow up 22 yo F hx of HSV on acyclovir presents for n, v, diarrhea for the past 2 weeks onset after eating korean bbq. Diarrhea multiple episodes per dy, today 3x, non bloody, initially very watery, now just soft. Nausea and vomiting has been improving, though last vomited last night. Denies recent travel. Took one dose albendazole her aunt had. States her LMP was 2 weeks prior. Denies vaginal discharge or bleeding. VS WNL. PE well appearing F, moist mm, no distress, +BS, soft, mild TTP R mid abdomen, L mid abdomen, and LLQ, w/o grr. Ddx includes but is not limited to: infectious diarrhea, autoimmune (such as IBD, electrolyte abn 2/2 nvd, dec po. Check basics, can send Stool culture, stool PCR, and O&P if she goes here, will send HIV (pt consented), STD screening as well. HIV rapid negative, electrolytes wnl, no BM here so unable to send. Plan for dc with outpatient GI follow up

## 2023-12-15 NOTE — ED PROVIDER NOTE - PATIENT PORTAL LINK FT
You can access the FollowMyHealth Patient Portal offered by [facility] by registering at the following website: [email or web address]. By joining [employer]’s FollowMyHealth portal, you will also be able to view your health information using other applications (apps) compatible with our system. You can access the FollowMyHealth Patient Portal offered by [facility] by registering at the following website: [email or web address]. By joining [employer]’s FollowMyHealth portal, you will also be able to view your health information using other applications (apps) compatible with our system.

## 2023-12-15 NOTE — ED PROVIDER NOTE - NSFOLLOWUPINSTRUCTIONS_ED_ALL_ED_FT
You were examined for diarrhea and found to have normal electrolytes. Your chronic diarrhea may be signs of a gastrointestinal pathology and you should follow up with a gastroenterologist outpatient for further testing and investigation. Also, you had additional laboratory testing which may have been pending at time of discharge and can be followed up outpatient with your primary doctor. Further discharge instructions are included below:     You were examined for abdominal pain and found to have no serious cause of the abdominal pain at this time.   Please increase your diet as tolerated. Please increase the activity as tolerated. Please take all your medications as prescribed. Please follow up with the appropriate doctors.     It is important that you carefully watch for changes in the abdominal pain that might suggest a serious condition. See your doctor or return to the emergency department immediately if your condition gets worse. You can also call us if you are not sure what to do.  See your doctor tomorrow or as soon as possible if you are not getting better.  These symptoms suggest serious causes of abdominal pain. Call your doctor or return to the emergency  department if you are feeling worse or if:  1. You are unable to walk easily or are walking in a bent-over position.  2. Stepping or jumping results in severe pain.  3. You are experiencing pain in the right lower part of the abdomen.  4. The abdomen is hard and painful when you press on it.  5. There is severe abdominal pain when coughing.  6. You are vomiting or gagging.  7. Vomiting is bloody or green or looks like chocolate or coffee.  8. The belly looks very full or big.  9. You are experiencing severe pain every 3 to 20 minutes.  10. Stool (poop) is bloody or black.  11. You are drowsy, weak, fussy, or pale    Diarrhea    WHAT YOU NEED TO KNOW:    DISCHARGE INSTRUCTIONS:  Return to the emergency department if:   You feel confused.   Your heartbeat is faster than usual.   Your eyes look deeply sunken, or you have no tears when you cry.   You urinate less than usual, or your urine is dark yellow.   You have blood or mucus in your bowel movements.  You have severe abdominal pain.   You are unable to drink any liquids.     Contact your healthcare provider if:   Your symptoms do not get better with treatment.   You have a fever higher than 101.3°F (38.5°C).   You have trouble eating and drinking because you are vomiting.   Your diarrhea does not get better in 7 days.   You have questions or concerns about your condition or care.     Follow up with your healthcare provider as directed: Write down your questions so you remember to ask them during your visits.     Medicines:  Diarrhea medicine is an over-the-counter medicine that helps slow or stop your diarrhea. Do not take this medicine unless your healthcare provider says it is okay.   Antibiotics may be given to help treat an infection caused by bacteria.   Antiparasitics may be given to treat an infection caused by parasites.   Take your medicine as directed. Contact your healthcare provider if you think your medicine is not helping or if you have side effects. Tell him of her if you are allergic to any medicine. Keep a list of the medicines, vitamins, and herbs you take. Include the amounts, and when and why you take them. Bring the list or the pill bottles to follow-up visits. Carry your medicine list with you in case of an emergency.    Self-care:   Drink liquids as directed. Liquids will help prevent dehydration caused by diarrhea. Ask your healthcare provider how much liquid to drink each day and which liquids are best for you. You may need to drink an oral rehydration solution (ORS). An ORS has the right amounts of water, salts, and sugar you need to replace body fluids. You can buy an ORS at most grocery stores and pharmacies.   Eat foods that are easy to digest. Examples include rice, lentils, cereal, bananas, potatoes, and bread. It also includes some fruits (bananas, melon), well-cooked vegetables, and lean meats. Do not eat foods high in fiber, fat, and sugar. Do not drink alcohol until your diarrhea is gone.     Prevent diarrhea:   Wash your hands often. Use soap and water. Wash your hands before you eat or prepare food. Also wash your hands after you use the bathroom. Use an alcohol-based hand gel when soap and water are not available. Handwashing  Keep bathroom surfaces clean. This helps prevent the spread of germs that cause acute diarrhea.   Wash fruits and vegetables well before you eat them. This can help remove germs that cause diarrhea. If possible, remove the skin from fruits and vegetables, or cook them well before you eat them.   Cook meat and poultry as directed. Meat includes beef and pork. Poultry includes chicken, turkey, and duck.  Cook ground meat to 160°F.   Cook ground poultry, whole poultry, or cuts of poultry to at least 165°F. Remove the poultry from heat. Let it stand for 3 minutes before you eat it.   Cook whole cuts of meat other than poultry to at least 145°F. Remove the meat from heat. Let it stand for 3 minutes before you eat it.   Wash dishes that have touched raw meat or poultry with hot water and soap. This includes cutting boards, utensils, dishes, and serving containers.   Place raw or cooked meat or poultry in the refrigerator as soon as possible. Bacteria can grow in meat or poultry that is left at room temperature too long.   Do not eat raw or undercooked oysters, clams, or mussels. These foods may be contaminated and cause infection.   Drink only filtered or treated water when you travel. Do not put ice in your drinks. Drink bottled water whenever possible.

## 2023-12-15 NOTE — ED PROVIDER NOTE - OBJECTIVE STATEMENT
22 yo F hx of vaginal candidiasis and HSV presents for n, v, diarrhea, and LLQ abdominal pain for the past 2 weeks. States she has been having diarrhea that is light-brown and non-bloody for the past 2 weeks. Last vomiting episode was last night. Denies recent travel. States her family gives her Albendazole (not prescribed) because everyone takes it for unclear reason. States her LMP was 2 weeks prior. Denies vaginal discharge or bleeding. 24 yo F hx of vaginal candidiasis and HSV presents for n, v, diarrhea, and LLQ abdominal pain for the past 2 weeks. States she has been having diarrhea that is light-brown and non-bloody for the past 2 weeks. Last vomiting episode was last night. Denies recent travel. States her family gives her Albendazole (not prescribed) because everyone takes it for unclear reason. States her LMP was 2 weeks prior. Denies vaginal discharge or bleeding.

## 2023-12-15 NOTE — ED PROVIDER NOTE - NSFOLLOWUPCLINICS_GEN_ALL_ED_FT
Gastroenterology at [facility]  Gastroenterology  [address] [place], [place] [address]  Phone: [phone]  Fax:   Follow Up Time: 1-3 Days     Gastroenterology at [facility]  Gastroenterology  [address] [place], [place] [id number]  Phone: [phone]  Fax:   Follow Up Time: 1-3 Days

## 2023-12-15 NOTE — ED PROVIDER NOTE - CARE PROVIDER_API CALL
Bella Iglesias  Internal Medicine  [address], [address]  [place] [address]  Phone: [phone]  Fax: [phone]  Follow Up Time: 1-3 Days   Bella Iglesias  Internal Medicine  [address], [address]  [place] [address]  Phone: [phone]  Fax: [phone]  Follow Up Time: 1-3 Days

## 2023-12-15 NOTE — ED ADULT NURSE NOTE - NSFALLUNIVINTERV_ED_ALL_ED
Bed/Stretcher in lowest position, wheels locked, appropriate side rails in place/Call bell, personal items and telephone in reach/Instruct patient to call for assistance before getting out of bed/chair/stretcher/Non-slip footwear applied when patient is off stretcher/[place] to call system/Physically safe environment - no spills, clutter or unnecessary equipment/Purposeful proactive rounding/Room/bathroom lighting operational, light cord in reach Bed/Stretcher in lowest position, wheels locked, appropriate side rails in place/Call bell, personal items and telephone in reach/Instruct patient to call for assistance before getting out of bed/chair/stretcher/Non-slip footwear applied when patient is off stretcher/[place] to call system/Physically safe environment - no spills, clutter or unnecessary equipment/Purposeful proactive rounding/Room/bathroom lighting operational, light cord in reach

## 2023-12-15 NOTE — ED ADULT NURSE NOTE - OBJECTIVE STATEMENT
Patient is a 23y female presenting to the ED ambulatory from home with c/o nausea, vomiting, and diarrhea. Patient A&Ox4. Patient is speaking coherently in full sentences. Reports having nausea, vomiting and diarrhea x 2 weeks. States the nausea and vomiting have "slightly improved" in that she can tolerate PO intake but continues having multiple episodes of diarrhea each day. Reports noticing the symptoms started after eating "Korean fried chicken." Denies any blood in the stool. Denies fever/chills, chest pain, SOB, headaches, dizziness. Abdomen soft, non-distended and is diffusely tender to palpation. No swelling or rashes noted to bilateral upper and lower extremities.

## 2025-04-10 NOTE — ED ADULT NURSE NOTE - NSFALLRSKINDICATORS_ED_ALL_ED
-My office will call once injection is approved  -Ice/Heat as tolerated  -Lidocaine patch as tolerated  -Follow up 2 weeks after procedure   no